# Patient Record
Sex: MALE | URBAN - METROPOLITAN AREA
[De-identification: names, ages, dates, MRNs, and addresses within clinical notes are randomized per-mention and may not be internally consistent; named-entity substitution may affect disease eponyms.]

---

## 2020-02-19 ENCOUNTER — APPOINTMENT (OUTPATIENT)
Age: 55
Setting detail: DERMATOLOGY
End: 2020-02-20

## 2020-02-19 DIAGNOSIS — L85.3 XEROSIS CUTIS: ICD-10-CM

## 2020-02-19 DIAGNOSIS — L738 OTHER SPECIFIED DISEASES OF HAIR AND HAIR FOLLICLES: ICD-10-CM

## 2020-02-19 DIAGNOSIS — L663 OTHER SPECIFIED DISEASES OF HAIR AND HAIR FOLLICLES: ICD-10-CM

## 2020-02-19 PROBLEM — L02.425 FURUNCLE OF RIGHT LOWER LIMB: Status: ACTIVE | Noted: 2020-02-19

## 2020-02-19 PROCEDURE — 99203 OFFICE O/P NEW LOW 30 MIN: CPT

## 2020-02-19 PROCEDURE — OTHER COUNSELING: OTHER

## 2020-02-19 PROCEDURE — OTHER PRESCRIPTION: OTHER

## 2020-02-19 PROCEDURE — OTHER MIPS QUALITY: OTHER

## 2020-02-19 PROCEDURE — OTHER REASSURANCE: OTHER

## 2020-02-19 RX ORDER — MUPIROCIN 20 MG/G
2% OINTMENT TOPICAL BID
Qty: 1 | Refills: 2 | Status: ERX | COMMUNITY
Start: 2020-02-19

## 2020-02-19 ASSESSMENT — LOCATION ZONE DERM
LOCATION ZONE: LEG
LOCATION ZONE: ARM
LOCATION ZONE: HAND

## 2020-02-19 ASSESSMENT — LOCATION DETAILED DESCRIPTION DERM
LOCATION DETAILED: RIGHT LATERAL PROXIMAL PRETIBIAL REGION
LOCATION DETAILED: LEFT DORSAL WRIST
LOCATION DETAILED: RIGHT PROXIMAL PRETIBIAL REGION
LOCATION DETAILED: LEFT PROXIMAL PRETIBIAL REGION
LOCATION DETAILED: LEFT VENTRAL DISTAL FOREARM
LOCATION DETAILED: RIGHT ULNAR DORSAL HAND
LOCATION DETAILED: RIGHT VENTRAL DISTAL FOREARM

## 2020-02-19 ASSESSMENT — LOCATION SIMPLE DESCRIPTION DERM
LOCATION SIMPLE: LEFT PRETIBIAL REGION
LOCATION SIMPLE: LEFT WRIST
LOCATION SIMPLE: LEFT FOREARM
LOCATION SIMPLE: RIGHT FOREARM
LOCATION SIMPLE: RIGHT HAND
LOCATION SIMPLE: RIGHT PRETIBIAL REGION

## 2020-04-15 ENCOUNTER — APPOINTMENT (OUTPATIENT)
Age: 55
Setting detail: DERMATOLOGY
End: 2020-04-15

## 2020-04-15 DIAGNOSIS — L738 OTHER SPECIFIED DISEASES OF HAIR AND HAIR FOLLICLES: ICD-10-CM

## 2020-04-15 DIAGNOSIS — L663 OTHER SPECIFIED DISEASES OF HAIR AND HAIR FOLLICLES: ICD-10-CM

## 2020-04-15 PROBLEM — L02.425 FURUNCLE OF RIGHT LOWER LIMB: Status: ACTIVE | Noted: 2020-04-15

## 2020-04-15 PROCEDURE — OTHER CONSENT FOR TELEMEDICINE VISIT OBTAINED: OTHER

## 2020-04-15 PROCEDURE — OTHER TELEPHONE E/M SERVICE: OTHER

## 2020-04-15 PROCEDURE — 99442: CPT

## 2020-04-15 PROCEDURE — OTHER ADDITIONAL NOTES: OTHER

## 2020-04-15 PROCEDURE — OTHER COUNSELING: OTHER

## 2020-04-15 ASSESSMENT — LOCATION SIMPLE DESCRIPTION DERM: LOCATION SIMPLE: RIGHT PRETIBIAL REGION

## 2020-04-15 ASSESSMENT — LOCATION DETAILED DESCRIPTION DERM: LOCATION DETAILED: RIGHT LATERAL PROXIMAL PRETIBIAL REGION

## 2020-04-15 ASSESSMENT — LOCATION ZONE DERM: LOCATION ZONE: LEG

## 2020-04-15 NOTE — PROCEDURE: TELEPHONE E/M SERVICE
Telephone E/M Code: CPT 89842: 11-20 minutes: Medical discussion, for an established patient, parent, or guardian Telephone E/M Code: CPT 56726: 11-20 minutes: Medical discussion, for an established patient, parent, or guardian

## 2020-04-15 NOTE — PROCEDURE: ADDITIONAL NOTES
Additional Notes: Pt states his is doing well, his knee surgery was put off til august and he will call the office with an appointment once covid 19 is under control.
Detail Level: Simple

## 2020-04-15 NOTE — PROCEDURE: CONSENT FOR TELEMEDICINE VISIT OBTAINED
Consent Text: Patient was informed via telephone their visit today will be billed to their insurance.

## 2020-07-16 ENCOUNTER — APPOINTMENT (OUTPATIENT)
Age: 55
Setting detail: DERMATOLOGY
End: 2020-07-17

## 2020-07-16 ENCOUNTER — RX ONLY (RX ONLY)
Age: 55
End: 2020-07-16

## 2020-07-16 DIAGNOSIS — L738 OTHER SPECIFIED DISEASES OF HAIR AND HAIR FOLLICLES: ICD-10-CM

## 2020-07-16 DIAGNOSIS — L21.8 OTHER SEBORRHEIC DERMATITIS: ICD-10-CM

## 2020-07-16 DIAGNOSIS — L663 OTHER SPECIFIED DISEASES OF HAIR AND HAIR FOLLICLES: ICD-10-CM

## 2020-07-16 PROBLEM — L02.425 FURUNCLE OF RIGHT LOWER LIMB: Status: ACTIVE | Noted: 2020-07-16

## 2020-07-16 PROCEDURE — OTHER PRESCRIPTION MEDICATION MANAGEMENT: OTHER

## 2020-07-16 PROCEDURE — OTHER PRESCRIPTION: OTHER

## 2020-07-16 PROCEDURE — OTHER COUNSELING: OTHER

## 2020-07-16 PROCEDURE — OTHER ADDITIONAL NOTES: OTHER

## 2020-07-16 PROCEDURE — OTHER REASSURANCE: OTHER

## 2020-07-16 PROCEDURE — 99213 OFFICE O/P EST LOW 20 MIN: CPT

## 2020-07-16 RX ORDER — KETOCONAZOLE 20 MG/G
2% CREAM TOPICAL BID
Qty: 1 | Refills: 5 | Status: ERX | COMMUNITY
Start: 2020-07-16

## 2020-07-16 RX ORDER — DOXYCYCLINE HYCLATE 100 MG/1
100 MG CAPSULE, GELATIN COATED ORAL ONCE A DAY
Qty: 30 | Refills: 5 | Status: ERX

## 2020-07-16 RX ORDER — KETOCONAZOLE 20 MG/ML
2% SHAMPOO, SUSPENSION TOPICAL
Qty: 2 | Refills: 5 | Status: ERX | COMMUNITY
Start: 2020-07-16

## 2020-07-16 RX ORDER — MUPIROCIN 20 MG/G
2% OINTMENT TOPICAL BID
Qty: 1 | Refills: 5 | Status: ERX

## 2020-07-16 ASSESSMENT — LOCATION SIMPLE DESCRIPTION DERM
LOCATION SIMPLE: HAIR
LOCATION SIMPLE: POSTERIOR NECK
LOCATION SIMPLE: RIGHT PRETIBIAL REGION

## 2020-07-16 ASSESSMENT — LOCATION DETAILED DESCRIPTION DERM
LOCATION DETAILED: RIGHT LATERAL PROXIMAL PRETIBIAL REGION
LOCATION DETAILED: HAIR
LOCATION DETAILED: RIGHT POSTERIOR NECK

## 2020-07-16 ASSESSMENT — LOCATION ZONE DERM
LOCATION ZONE: NECK
LOCATION ZONE: SCALP
LOCATION ZONE: LEG

## 2020-07-16 NOTE — PROCEDURE: ADDITIONAL NOTES
Additional Notes: Pt states his is doing well, his knee surgery is scheduled for September 15, 2020
Detail Level: Simple

## 2020-07-16 NOTE — PROCEDURE: PRESCRIPTION MEDICATION MANAGEMENT
Render In Strict Bullet Format?: No
Detail Level: Zone
Initiate Treatment: Ketaconazole cream and shampoo
Continue Regimen: Doxycycline and muprician ointment.